# Patient Record
Sex: MALE | Race: WHITE | NOT HISPANIC OR LATINO
[De-identification: names, ages, dates, MRNs, and addresses within clinical notes are randomized per-mention and may not be internally consistent; named-entity substitution may affect disease eponyms.]

---

## 2020-08-10 ENCOUNTER — TRANSCRIPTION ENCOUNTER (OUTPATIENT)
Age: 70
End: 2020-08-10

## 2021-02-02 ENCOUNTER — APPOINTMENT (OUTPATIENT)
Dept: CARDIOLOGY | Facility: CLINIC | Age: 71
End: 2021-02-02
Payer: COMMERCIAL

## 2021-02-02 ENCOUNTER — NON-APPOINTMENT (OUTPATIENT)
Age: 71
End: 2021-02-02

## 2021-02-02 VITALS
WEIGHT: 170 LBS | SYSTOLIC BLOOD PRESSURE: 140 MMHG | HEART RATE: 105 BPM | BODY MASS INDEX: 25.18 KG/M2 | DIASTOLIC BLOOD PRESSURE: 70 MMHG | HEIGHT: 69 IN | OXYGEN SATURATION: 97 %

## 2021-02-02 PROCEDURE — 99072 ADDL SUPL MATRL&STAF TM PHE: CPT

## 2021-02-02 PROCEDURE — 93246 EXT ECG>7D<15D RECORDING: CPT

## 2021-02-02 PROCEDURE — 99204 OFFICE O/P NEW MOD 45 MIN: CPT | Mod: 25

## 2021-02-02 PROCEDURE — 93000 ELECTROCARDIOGRAM COMPLETE: CPT | Mod: 59

## 2021-02-02 NOTE — REVIEW OF SYSTEMS
[Negative] : Heme/Lymph [see HPI] : see HPI [Shortness Of Breath] : no shortness of breath [Dyspnea on exertion] : not dyspnea during exertion [Chest  Pressure] : no chest pressure [Chest Pain] : no chest pain [Lower Ext Edema] : no extremity edema [Leg Claudication] : no intermittent leg claudication [Palpitations] : palpitations

## 2021-02-02 NOTE — HISTORY OF PRESENT ILLNESS
[FreeTextEntry1] : Pt is a 71 y/o M who presents today for evaluation.  He has PMH SVT dx about 20 yrs ago, he was not started on medications.  Since then he has had occasional episodes and recently the episodes are more frequent.  Now he gets episodes every 2.5-3 weeks which last 2-3hrs and the next day he will still feel shorter episodes.  \par He has a sleep study pending  \par COVID+ 2021 hospitalized at [facility]\par \par PMH: SVT\par PCP Dr Jeferson Hassan in [place]\par Smoking status: never\par social ETOH\par no drug use\par Current exercise: walking 1-2 miles 3-4x/week\par Daily water intake: 32-50 oz\par Daily caffeine intake: 2 cups coffee\par OTC medications: tylenol PRN\par Family hx: mother rheumatic fever\par Previous cardiac testin yrs ago stress test and TTE "normal"\par Previous hospitalizations: COVID 2021\par

## 2021-02-02 NOTE — PHYSICAL EXAM
[General Appearance - Well Developed] : well developed [Normal Appearance] : normal appearance [Well Groomed] : well groomed [General Appearance - Well Nourished] : well nourished [No Deformities] : no deformities [General Appearance - In No Acute Distress] : no acute distress [Normal Conjunctiva] : the conjunctiva exhibited no abnormalities [Eyelids - No Xanthelasma] : the eyelids demonstrated no xanthelasmas [Normal Oral Mucosa] : normal oral mucosa [No Oral Pallor] : no oral pallor [No Oral Cyanosis] : no oral cyanosis [Heart Rate And Rhythm] : heart rate and rhythm were normal [Heart Sounds] : normal S1 and S2 [Arterial Pulses Normal] : the arterial pulses were normal [Edema] : no peripheral edema present [Respiration, Rhythm And Depth] : normal respiratory rhythm and effort [Exaggerated Use Of Accessory Muscles For Inspiration] : no accessory muscle use [Auscultation Breath Sounds / Voice Sounds] : lungs were clear to auscultation bilaterally [Abdomen Soft] : soft [Abdomen Tenderness] : non-tender [Abdomen Mass (___ Cm)] : no abdominal mass palpated [Abnormal Walk] : normal gait [Gait - Sufficient For Exercise Testing] : the gait was sufficient for exercise testing [Nail Clubbing] : no clubbing of the fingernails [Cyanosis, Localized] : no localized cyanosis [Petechial Hemorrhages (___cm)] : no petechial hemorrhages [] : no ischemic changes [Oriented To Time, Place, And Person] : oriented to person, place, and time [Impaired Insight] : insight and judgment were intact [Affect] : the affect was normal [Mood] : the mood was normal [No Anxiety] : not feeling anxious [Systolic grade ___/6] : A grade [unfilled]/6 systolic murmur was heard. [FreeTextEntry1] : harsh systolic murmur

## 2021-02-02 NOTE — DISCUSSION/SUMMARY
[FreeTextEntry1] : Pt is a 69 y/o M with PMH SVT now with worsening palpitations\par + systolic murmur on exam\par Will check transthoracic echocardiogram to evaluate left ventricular function and assess for any structural abnormalities\par Given pt's symptoms will check quezada monitor to assess for ectopy.  Advised adequate hydration.  Drug use, OTC medication use, caffeine consumption reviewed \par Will check nuclear stress test to eval for ischemia\par start metoprolol succinate 25mg qd\par Advised pt to go to the nearest ED if symptoms persist or worsen\par Will try and get recent lab work done at PCP office\par The described plan was discussed with the pt.  All questions and concerns were addressed to the best of my knowledge.

## 2021-02-25 ENCOUNTER — NON-APPOINTMENT (OUTPATIENT)
Age: 71
End: 2021-02-25

## 2021-03-01 PROCEDURE — 93248 EXT ECG>7D<15D REV&INTERPJ: CPT

## 2021-04-02 ENCOUNTER — APPOINTMENT (OUTPATIENT)
Dept: CARDIOLOGY | Facility: CLINIC | Age: 71
End: 2021-04-02
Payer: COMMERCIAL

## 2021-04-02 PROCEDURE — 93017 CV STRESS TEST TRACING ONLY: CPT

## 2021-04-02 PROCEDURE — 93018 CV STRESS TEST I&R ONLY: CPT

## 2021-04-02 PROCEDURE — A9500: CPT

## 2021-04-02 PROCEDURE — 99072 ADDL SUPL MATRL&STAF TM PHE: CPT

## 2021-04-02 PROCEDURE — 78452 HT MUSCLE IMAGE SPECT MULT: CPT

## 2021-04-05 ENCOUNTER — APPOINTMENT (OUTPATIENT)
Dept: CARDIOLOGY | Facility: CLINIC | Age: 71
End: 2021-04-05

## 2021-06-24 ENCOUNTER — APPOINTMENT (OUTPATIENT)
Dept: CARDIOLOGY | Facility: CLINIC | Age: 71
End: 2021-06-24
Payer: COMMERCIAL

## 2021-06-24 ENCOUNTER — NON-APPOINTMENT (OUTPATIENT)
Age: 71
End: 2021-06-24

## 2021-06-24 VITALS
OXYGEN SATURATION: 99 % | DIASTOLIC BLOOD PRESSURE: 94 MMHG | HEART RATE: 93 BPM | SYSTOLIC BLOOD PRESSURE: 146 MMHG | BODY MASS INDEX: 26.66 KG/M2 | WEIGHT: 180 LBS | HEIGHT: 69 IN

## 2021-06-24 DIAGNOSIS — G47.33 OBSTRUCTIVE SLEEP APNEA (ADULT) (PEDIATRIC): ICD-10-CM

## 2021-06-24 DIAGNOSIS — Z00.00 ENCOUNTER FOR GENERAL ADULT MEDICAL EXAMINATION W/OUT ABNORMAL FINDINGS: ICD-10-CM

## 2021-06-24 PROCEDURE — 99214 OFFICE O/P EST MOD 30 MIN: CPT | Mod: 25

## 2021-06-24 PROCEDURE — 99072 ADDL SUPL MATRL&STAF TM PHE: CPT

## 2021-06-24 PROCEDURE — 93000 ELECTROCARDIOGRAM COMPLETE: CPT

## 2021-07-02 ENCOUNTER — NON-APPOINTMENT (OUTPATIENT)
Age: 71
End: 2021-07-02

## 2021-07-02 PROBLEM — G47.33 OSA (OBSTRUCTIVE SLEEP APNEA): Status: ACTIVE | Noted: 2021-03-31

## 2021-07-02 NOTE — HISTORY OF PRESENT ILLNESS
[FreeTextEntry1] : Pt is a 70 y/o M PMH SVT dx about 20 yrs ago, he was not started on medications.  Last OV he was started on metoprolol 25mg which has worked very well.  For the past mnth he has noticed an increase in his palpitations - he wants to increase his metoprolol \par COVID+ 2021 hospitalized at [facility]\par \par quezada 2021 NSR brief episodes SVT\par nuclear stress test 2021 normal myocardial perfusion, EF 58%\par \par PMH: SVT\par PCP Dr Jeferson Hassan in [place]\par Smoking status: never\par social ETOH\par no drug use\par Current exercise: walking 1-2 miles 3-4x/week\par Daily water intake: 32-50 oz\par Daily caffeine intake: 2 cups coffee\par OTC medications: tylenol PRN\par Family hx: mother rheumatic fever\par Previous cardiac testin yrs ago stress test and TTE "normal"\par Previous hospitalizations: COVID 2021\par

## 2021-07-02 NOTE — DISCUSSION/SUMMARY
[FreeTextEntry1] : Pt is a 72 y/o M with PMH SVT now with worsening palpitations\par + systolic murmur on exam\par Will check transthoracic echocardiogram to evaluate left ventricular function and assess for any structural abnormalities\par increase metoprolol succinate to 50mg qd\par Advised pt to go to the nearest ED if symptoms persist or worsen\par Pt will return in 3-6 mnths or sooner as needed but I encouraged communication via phone or portal if necessary. \par The described plan was discussed with the pt.  All questions and concerns were addressed to the best of my knowledge.

## 2021-07-02 NOTE — PHYSICAL EXAM
[Well Developed] : well developed [Well Nourished] : well nourished [No Acute Distress] : no acute distress [Normal Conjunctiva] : normal conjunctiva [Normal Venous Pressure] : normal venous pressure [No Carotid Bruit] : no carotid bruit [Normal S1, S2] : normal S1, S2 [No Rub] : no rub [No Gallop] : no gallop [Clear Lung Fields] : clear lung fields [Good Air Entry] : good air entry [No Respiratory Distress] : no respiratory distress  [Soft] : abdomen soft [Non Tender] : non-tender [No Masses/organomegaly] : no masses/organomegaly [Normal Bowel Sounds] : normal bowel sounds [Normal Gait] : normal gait [No Edema] : no edema [No Cyanosis] : no cyanosis [No Clubbing] : no clubbing [No Varicosities] : no varicosities [No Rash] : no rash [No Skin Lesions] : no skin lesions [Moves all extremities] : moves all extremities [No Focal Deficits] : no focal deficits [Normal Speech] : normal speech [Alert and Oriented] : alert and oriented [Normal memory] : normal memory [de-identified] : +sys murmur

## 2021-07-02 NOTE — REVIEW OF SYSTEMS
[Palpitations] : palpitations [Negative] : Heme/Lymph [SOB] : no shortness of breath [Leg Claudication] : no intermittent leg claudication [Syncope] : no syncope

## 2022-04-01 ENCOUNTER — RX RENEWAL (OUTPATIENT)
Age: 72
End: 2022-04-01

## 2022-04-19 ENCOUNTER — TRANSCRIPTION ENCOUNTER (OUTPATIENT)
Age: 72
End: 2022-04-19

## 2022-05-14 ENCOUNTER — NON-APPOINTMENT (OUTPATIENT)
Age: 72
End: 2022-05-14

## 2022-06-28 ENCOUNTER — RX RENEWAL (OUTPATIENT)
Age: 72
End: 2022-06-28

## 2022-07-19 ENCOUNTER — NON-APPOINTMENT (OUTPATIENT)
Age: 72
End: 2022-07-19

## 2022-07-19 ENCOUNTER — APPOINTMENT (OUTPATIENT)
Dept: CARDIOLOGY | Facility: CLINIC | Age: 72
End: 2022-07-19

## 2022-07-19 VITALS
WEIGHT: 179 LBS | HEART RATE: 77 BPM | BODY MASS INDEX: 26.51 KG/M2 | SYSTOLIC BLOOD PRESSURE: 154 MMHG | DIASTOLIC BLOOD PRESSURE: 96 MMHG | HEIGHT: 69 IN

## 2022-07-19 DIAGNOSIS — R00.2 PALPITATIONS: ICD-10-CM

## 2022-07-19 PROCEDURE — 93000 ELECTROCARDIOGRAM COMPLETE: CPT

## 2022-07-19 PROCEDURE — 99214 OFFICE O/P EST MOD 30 MIN: CPT | Mod: 25

## 2022-07-19 NOTE — DISCUSSION/SUMMARY
[FreeTextEntry1] : Pt is a 71 y/o M with PMH SVT, murmur\par \par SVT:\par metoprolol 50mg has helped with symptoms\par Advised to stay well hydrated, regular exercise\par \par + systolic murmur on exam:\par Will check transthoracic echocardiogram to evaluate left ventricular function and assess for any structural abnormalities\par Advised importance of keeping appointment\par \par HTN:\par not well controlled\par metoprolol recently increased to 50mg qd\par Advised low salt diet, regular exercise, weight loss \par He will check BP at home\par check labs\par \par Pt will return in 3-4 mnths or sooner as needed but I encouraged communication via phone or portal if necessary. \par The described plan was discussed with the pt.  All questions and concerns were addressed to the best of my knowledge.

## 2022-07-19 NOTE — HISTORY OF PRESENT ILLNESS
[FreeTextEntry1] : Pt is a 73 y/o M PMH SVT dx about 20 yrs ago, he has been taking metoprolol 25mg and recently increased to 50mg due to palpitations which has helped.  Pt reports feeling well and has no active cardiac complaints - denies CP, SOB, dizziness, syncope, edema, orthopnea, PND, orthopnea.  No exertional symptoms. \par He has a systolic murmur on exam, possibly of AS, but has no showed his TTE appointments in the past\par COVID+ 01/2021 hospitalized at [facility]\par \par quezada 02/2021 NSR brief episodes SVT\par nuclear stress test 04/2021 normal myocardial perfusion, EF 58%\par \par PMH: SVT\par PCP Dr Jeferson Hassan in [place]\par Smoking status: never\par social ETOH\par no drug use\par Current exercise: walking 1-2 miles 3-4x/week\par Daily water intake: 32-50 oz\par Daily caffeine intake: 2 cups coffee\par OTC medications: tylenol PRN\par Family hx: mother rheumatic fever\par Previous hospitalizations: COVID 01/2021\par

## 2022-07-19 NOTE — PHYSICAL EXAM
[Well Developed] : well developed [Well Nourished] : well nourished [No Acute Distress] : no acute distress [Normal Conjunctiva] : normal conjunctiva [Normal Venous Pressure] : normal venous pressure [No Carotid Bruit] : no carotid bruit [Normal S1, S2] : normal S1, S2 [No Rub] : no rub [No Gallop] : no gallop [Clear Lung Fields] : clear lung fields [Good Air Entry] : good air entry [No Respiratory Distress] : no respiratory distress  [Soft] : abdomen soft [Non Tender] : non-tender [No Masses/organomegaly] : no masses/organomegaly [Normal Bowel Sounds] : normal bowel sounds [Normal Gait] : normal gait [No Edema] : no edema [No Cyanosis] : no cyanosis [No Clubbing] : no clubbing [No Varicosities] : no varicosities [No Rash] : no rash [No Skin Lesions] : no skin lesions [Moves all extremities] : moves all extremities [No Focal Deficits] : no focal deficits [Normal Speech] : normal speech [Alert and Oriented] : alert and oriented [Normal memory] : normal memory [de-identified] : +sys murmur

## 2022-08-16 ENCOUNTER — APPOINTMENT (OUTPATIENT)
Dept: CARDIOLOGY | Facility: CLINIC | Age: 72
End: 2022-08-16

## 2022-08-16 PROCEDURE — 93306 TTE W/DOPPLER COMPLETE: CPT

## 2022-08-17 ENCOUNTER — NON-APPOINTMENT (OUTPATIENT)
Age: 72
End: 2022-08-17

## 2022-09-06 ENCOUNTER — APPOINTMENT (OUTPATIENT)
Dept: CARDIOTHORACIC SURGERY | Facility: CLINIC | Age: 72
End: 2022-09-06

## 2022-09-06 VITALS — RESPIRATION RATE: 16 BRPM | BODY MASS INDEX: 25.92 KG/M2 | WEIGHT: 175 LBS | HEIGHT: 69 IN

## 2022-09-06 DIAGNOSIS — Z82.49 FAMILY HISTORY OF ISCHEMIC HEART DISEASE AND OTHER DISEASES OF THE CIRCULATORY SYSTEM: ICD-10-CM

## 2022-09-06 DIAGNOSIS — Z78.9 OTHER SPECIFIED HEALTH STATUS: ICD-10-CM

## 2022-09-06 PROCEDURE — 99205 OFFICE O/P NEW HI 60 MIN: CPT

## 2022-09-06 NOTE — PHYSICAL EXAM
[General Appearance - Well Nourished] : well nourished [General Appearance - Well Developed] : well developed [Sclera] : the sclera and conjunctiva were normal [Outer Ear] : the ears and nose were normal in appearance [Neck Appearance] : the appearance of the neck was normal [Respiration, Rhythm And Depth] : normal respiratory rhythm and effort [Auscultation Breath Sounds / Voice Sounds] : lungs were clear to auscultation bilaterally [Heart Rate And Rhythm] : heart rate was normal and rhythm regular [Examination Of The Chest] : the chest was normal in appearance [2+] : left 2+ [Abnormal Walk] : normal gait [Motor Tone] : muscle strength and tone were normal [Skin Color & Pigmentation] : normal skin color and pigmentation [Sensation] : the sensory exam was normal to light touch and pinprick [Motor Exam] : the motor exam was normal [Oriented To Time, Place, And Person] : oriented to person, place, and time [Impaired Insight] : insight and judgment were intact

## 2022-09-07 NOTE — HISTORY OF PRESENT ILLNESS
[FreeTextEntry1] : Mr. LIRIANO is a 72 year old male referred by Dr. Bailey who presents for consultation. His past medical history includes HTN, TAMARA and paroxysmal SVT. He was originally evaluated a year ago for his SVT and was placed on beta blocker. \par \par He presents to the office today to discuss recent echocardiogram imaging consistent with mitral valve regurgitation.\par \par \par \par

## 2022-09-07 NOTE — DATA REVIEWED
[FreeTextEntry1] : Transthoracic Echocardiogram from 08/16/22 at [facility]\par - LVEF 65%\par - MAC and calcified mitral leaflets with normal opening. Mitral valve prolapse vs possible flail involving the posterior mitral leaflet. Severe mitral regurgitation anteriorly directed. PMVG 10mmHg\par - Severely dilated left atrium\par - Mild-moderate TR\par

## 2022-09-07 NOTE — CONSULT LETTER
[Dear  ___] : Dear  [unfilled], [Consult Letter:] : I had the pleasure of evaluating your patient, [unfilled]. [Please see my note below.] : Please see my note below. [Consult Closing:] : Thank you very much for allowing me to participate in the care of this patient.  If you have any questions, please do not hesitate to contact me. [Sincerely,] : Sincerely, [FreeTextEntry2] : Alem Bailey MD [FreeTextEntry3] : Angel Rhodes MD\par  of Cardiothoracic Surgery\par [facility]\par [address] \[place] [address]\par [phone]\par

## 2022-09-07 NOTE — ASSESSMENT
[FreeTextEntry1] : Mr Santos reports to the office for discussion of echocardiogram results. 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 There is a large murmur on exam however he displays no symptoms. We discussed that based on the imaging presented mitral valve repair or replacement is warranted. \par \par He is changing insurance companies at this time and would like to wait to proceed until next month when his insurance changes. He will discuss this with his wife as he is extremely anxious to proceed with any sort of intervention. I am requesting additional imaging in preparation for possible surgical intervention. He will return to care after imaging to discuss plan of care. \par \par PLAN:\par - Transesophageal Echocardiogram \par - Cardiac Catheterization\par - Return to care after imaging \par \par \par \par \par \par Simon LOWRY NP am scribing for and in the presence of Dr. Rhodes the following sections HISTORY OF PRESENT ILLNESS, PAST MEDICAL/FAMILY/SOCIAL HISTORY; REVIEW OF SYSTEMS; VITAL SIGNS; PHYSICAL EXAM; DISPOSITION.\par \par "I personally performed the services described in the documentation, reviewed the documentation recorded by the scribe in my presence and accurately and completely records my words and actions."\par

## 2022-10-03 ENCOUNTER — RX RENEWAL (OUTPATIENT)
Age: 72
End: 2022-10-03

## 2022-11-01 ENCOUNTER — APPOINTMENT (OUTPATIENT)
Dept: CARDIOLOGY | Facility: CLINIC | Age: 72
End: 2022-11-01

## 2022-11-01 ENCOUNTER — NON-APPOINTMENT (OUTPATIENT)
Age: 72
End: 2022-11-01

## 2022-11-01 VITALS
HEART RATE: 83 BPM | BODY MASS INDEX: 27.55 KG/M2 | SYSTOLIC BLOOD PRESSURE: 160 MMHG | DIASTOLIC BLOOD PRESSURE: 90 MMHG | OXYGEN SATURATION: 100 % | HEIGHT: 69 IN | WEIGHT: 186 LBS

## 2022-11-01 DIAGNOSIS — I47.1 SUPRAVENTRICULAR TACHYCARDIA: ICD-10-CM

## 2022-11-01 PROCEDURE — 93000 ELECTROCARDIOGRAM COMPLETE: CPT

## 2022-11-01 PROCEDURE — 99214 OFFICE O/P EST MOD 30 MIN: CPT | Mod: 25

## 2022-11-01 NOTE — PHYSICAL EXAM
[Well Developed] : well developed [Well Nourished] : well nourished [No Acute Distress] : no acute distress [Normal Conjunctiva] : normal conjunctiva [Normal Venous Pressure] : normal venous pressure [No Carotid Bruit] : no carotid bruit [Normal S1, S2] : normal S1, S2 [No Rub] : no rub [No Gallop] : no gallop [Clear Lung Fields] : clear lung fields [Good Air Entry] : good air entry [No Respiratory Distress] : no respiratory distress  [Soft] : abdomen soft [Non Tender] : non-tender [No Masses/organomegaly] : no masses/organomegaly [Normal Bowel Sounds] : normal bowel sounds [Normal Gait] : normal gait [No Edema] : no edema [No Cyanosis] : no cyanosis [No Clubbing] : no clubbing [No Varicosities] : no varicosities [No Rash] : no rash [No Skin Lesions] : no skin lesions [Moves all extremities] : moves all extremities [No Focal Deficits] : no focal deficits [Normal Speech] : normal speech [Alert and Oriented] : alert and oriented [Normal memory] : normal memory [de-identified] : +sys murmur

## 2022-11-01 NOTE — DISCUSSION/SUMMARY
[FreeTextEntry1] : Pt is a 73 y/o M with PMH sev MR, SVT\par \par sev MR:\par EF 65%\par Was seen by Dr Rhodes, plan for cardiac cath and DARREN\par \par SVT:\par metoprolol 50mg has helped with symptoms\par Advised to stay well hydrated, regular exercise\par \par HTN:\par not well controlled\par c/w metoprolol 50mg qd\par increase losartan to 50mg qd\par Advised low salt diet, regular exercise, weight loss \par He will check BP at home\par \par Pt will return in 3-4 mnths or sooner as needed but I encouraged communication via phone or portal if necessary. \par The described plan was discussed with the pt.  All questions and concerns were addressed to the best of my knowledge.

## 2022-11-01 NOTE — HISTORY OF PRESENT ILLNESS
[FreeTextEntry1] : Pt is a 73 y/o M PMH sev MR, EF 65%, SVT dx about 20 yrs ago, he has been taking metoprolol 25mg and recently increased to 50mg due to palpitations which has helped.  \par He saw Dr Rhodes 09/2022 and was referred for cardiac cath and DARREN - i the process of scheduling\par Pt reports feeling well and has no active cardiac complaints - denies CP, SOB, dizziness, syncope, edema, orthopnea, PND, orthopnea.  No exertional symptoms. \par COVID+ 01/2021 hospitalized at [facility]\par \par TTE 08/2022 EF 65%, sev MR - possible flail leaflet, sev LAE, mild to mod TR\par quezada 02/2021 NSR brief episodes SVT\par nuclear stress test 04/2021 normal myocardial perfusion, EF 58%\par \par PMH: SVT\par PCP Dr Jeferson Hassan in [place]\par Smoking status: never\par social ETOH\par no drug use\par Current exercise: walking 1-2 miles 3-4x/week\par Daily water intake: 32-50 oz\par Daily caffeine intake: 2 cups coffee\par OTC medications: tylenol PRN\par Family hx: mother rheumatic fever\par Previous hospitalizations: COVID 01/2021\par

## 2022-11-11 RX ORDER — CHLORHEXIDINE GLUCONATE 213 G/1000ML
1 SOLUTION TOPICAL ONCE
Refills: 0 | Status: DISCONTINUED | OUTPATIENT
Start: 2022-11-14 | End: 2022-11-28

## 2022-11-11 NOTE — H&P PST ADULT - HISTORY OF PRESENT ILLNESS
Narrative:   71 YO male with severe MR, SVT, HTN who presents for LHC/DARREN to assess valve and R/O CAD. He has an abnormal TTE which showed severe MR, possible flail leaflet.    Symptoms:        Angina (Class):        Ischemic Symptoms:     Heart Failure:        Systolic/Diastolic/Combined:        NYHA Class (within 2 weeks):     Assessment of LVEF:       EF: 65%       Assessed by: TTE       Date: 8/2022    Prior Cardiac Interventions:       PCI's:        CABG:     Noninvasive Testing:   Stress Test: Date: 4/2021: normal          Echo: EF 65% sev MR-possible flail leaflet, sev LAE, mild to mod TR    Antianginal Therapies:        Beta Blockers:  Metoprolol       Calcium Channel Blockers:        Long Acting Nitrates:        Ranexa:     Associated Risk Factors:        Cerebrovascular Disease: N/A       Chronic Lung Disease: N/A       Peripheral Arterial Disease: N/A       Chronic Kidney Disease (if yes, what is GFR): N/A       Uncontrolled Diabetes (if yes, what is HgbA1C or FBS): N/A       Poorly Controlled Hypertension (if yes, what is SBP): N/A       Morbid Obesity (if yes, what is BMI): N/A       History of Recent Ventricular Arrhythmia: N/A       Inability to Ambulate Safely: N/A       Need for Therapeutic Anticoagulation: N/A       Antiplatelet or Contrast Allergy: N/A Narrative:   71 YO male with severe MR, SVT, HTN who presents for LHC/DARREN to assess valve and R/O CAD. He has an abnormal TTE which showed severe MR, possible flail leaflet.    Symptoms:        Angina (Class):        Ischemic Symptoms:     Heart Failure:        Systolic/Diastolic/Combined:        NYHA Class (within 2 weeks):     Assessment of LVEF:       EF: 65%       Assessed by: TTE       Date: 8/2022      Noninvasive Testing:   Stress Test: Date: 4/2021: normal          Echo: EF 65% sev MR-possible flail leaflet, sev LAE, mild to mod TR    Antianginal Therapies:        Beta Blockers:  Metoprolol       Calcium Channel Blockers:        Long Acting Nitrates:        Ranexa:     Associated Risk Factors:        Cerebrovascular Disease: N/A       Chronic Lung Disease: N/A       Peripheral Arterial Disease: N/A       Chronic Kidney Disease (if yes, what is GFR): N/A       Uncontrolled Diabetes (if yes, what is HgbA1C or FBS): N/A       Poorly Controlled Hypertension (if yes, what is SBP): N/A       Morbid Obesity (if yes, what is BMI): N/A       History of Recent Ventricular Arrhythmia: N/A       Inability to Ambulate Safely: N/A       Need for Therapeutic Anticoagulation: N/A       Antiplatelet or Contrast Allergy: N/A

## 2022-11-11 NOTE — H&P PST ADULT - ASSESSMENT
73 YO male with severe MR, SVT, HTN with severe MR, possible flail leaflet for DARREN/LHC  -Proceed with DARREN/LHC as planned  -Keep NPO  -Procedure explained in length and patient verbalizes understanding  -Cardiologist to obtain informed consent

## 2022-11-13 ENCOUNTER — FORM ENCOUNTER (OUTPATIENT)
Age: 72
End: 2022-11-13

## 2022-11-14 ENCOUNTER — TRANSCRIPTION ENCOUNTER (OUTPATIENT)
Age: 72
End: 2022-11-14

## 2022-11-14 ENCOUNTER — OUTPATIENT (OUTPATIENT)
Dept: OUTPATIENT SERVICES | Facility: HOSPITAL | Age: 72
LOS: 1 days | End: 2022-11-14
Payer: COMMERCIAL

## 2022-11-14 VITALS
OXYGEN SATURATION: 98 % | TEMPERATURE: 97 F | SYSTOLIC BLOOD PRESSURE: 162 MMHG | RESPIRATION RATE: 17 BRPM | DIASTOLIC BLOOD PRESSURE: 94 MMHG | HEART RATE: 79 BPM

## 2022-11-14 VITALS
SYSTOLIC BLOOD PRESSURE: 141 MMHG | RESPIRATION RATE: 16 BRPM | DIASTOLIC BLOOD PRESSURE: 85 MMHG | OXYGEN SATURATION: 97 % | HEART RATE: 67 BPM

## 2022-11-14 DIAGNOSIS — I34.0 NONRHEUMATIC MITRAL (VALVE) INSUFFICIENCY: ICD-10-CM

## 2022-11-14 LAB
ALBUMIN SERPL ELPH-MCNC: 4.8 G/DL — SIGNIFICANT CHANGE UP (ref 3.3–5.2)
ALP SERPL-CCNC: 74 U/L — SIGNIFICANT CHANGE UP (ref 40–120)
ALT FLD-CCNC: 21 U/L — SIGNIFICANT CHANGE UP
ANION GAP SERPL CALC-SCNC: 10 MMOL/L — SIGNIFICANT CHANGE UP (ref 5–17)
AST SERPL-CCNC: 20 U/L — SIGNIFICANT CHANGE UP
BASOPHILS # BLD AUTO: 0.03 K/UL — SIGNIFICANT CHANGE UP (ref 0–0.2)
BASOPHILS NFR BLD AUTO: 0.4 % — SIGNIFICANT CHANGE UP (ref 0–2)
BILIRUB SERPL-MCNC: 0.5 MG/DL — SIGNIFICANT CHANGE UP (ref 0.4–2)
BUN SERPL-MCNC: 15.7 MG/DL — SIGNIFICANT CHANGE UP (ref 8–20)
CALCIUM SERPL-MCNC: 9.6 MG/DL — SIGNIFICANT CHANGE UP (ref 8.4–10.5)
CHLORIDE SERPL-SCNC: 103 MMOL/L — SIGNIFICANT CHANGE UP (ref 96–108)
CO2 SERPL-SCNC: 27 MMOL/L — SIGNIFICANT CHANGE UP (ref 22–29)
CREAT SERPL-MCNC: 0.75 MG/DL — SIGNIFICANT CHANGE UP (ref 0.5–1.3)
EGFR: 97 ML/MIN/1.73M2 — SIGNIFICANT CHANGE UP
EOSINOPHIL # BLD AUTO: 0.02 K/UL — SIGNIFICANT CHANGE UP (ref 0–0.5)
EOSINOPHIL NFR BLD AUTO: 0.3 % — SIGNIFICANT CHANGE UP (ref 0–6)
GLUCOSE SERPL-MCNC: 114 MG/DL — HIGH (ref 70–99)
HCT VFR BLD CALC: 41.9 % — SIGNIFICANT CHANGE UP (ref 39–50)
HGB BLD-MCNC: 14.8 G/DL — SIGNIFICANT CHANGE UP (ref 13–17)
IMM GRANULOCYTES NFR BLD AUTO: 0.4 % — SIGNIFICANT CHANGE UP (ref 0–0.9)
LYMPHOCYTES # BLD AUTO: 2.18 K/UL — SIGNIFICANT CHANGE UP (ref 1–3.3)
LYMPHOCYTES # BLD AUTO: 32 % — SIGNIFICANT CHANGE UP (ref 13–44)
MCHC RBC-ENTMCNC: 31.2 PG — SIGNIFICANT CHANGE UP (ref 27–34)
MCHC RBC-ENTMCNC: 35.3 GM/DL — SIGNIFICANT CHANGE UP (ref 32–36)
MCV RBC AUTO: 88.4 FL — SIGNIFICANT CHANGE UP (ref 80–100)
MONOCYTES # BLD AUTO: 0.35 K/UL — SIGNIFICANT CHANGE UP (ref 0–0.9)
MONOCYTES NFR BLD AUTO: 5.1 % — SIGNIFICANT CHANGE UP (ref 2–14)
NEUTROPHILS # BLD AUTO: 4.21 K/UL — SIGNIFICANT CHANGE UP (ref 1.8–7.4)
NEUTROPHILS NFR BLD AUTO: 61.8 % — SIGNIFICANT CHANGE UP (ref 43–77)
PLATELET # BLD AUTO: 166 K/UL — SIGNIFICANT CHANGE UP (ref 150–400)
POTASSIUM SERPL-MCNC: 4.3 MMOL/L — SIGNIFICANT CHANGE UP (ref 3.5–5.3)
POTASSIUM SERPL-SCNC: 4.3 MMOL/L — SIGNIFICANT CHANGE UP (ref 3.5–5.3)
PROT SERPL-MCNC: 7.4 G/DL — SIGNIFICANT CHANGE UP (ref 6.6–8.7)
RBC # BLD: 4.74 M/UL — SIGNIFICANT CHANGE UP (ref 4.2–5.8)
RBC # FLD: 12.7 % — SIGNIFICANT CHANGE UP (ref 10.3–14.5)
SODIUM SERPL-SCNC: 140 MMOL/L — SIGNIFICANT CHANGE UP (ref 135–145)
WBC # BLD: 6.82 K/UL — SIGNIFICANT CHANGE UP (ref 3.8–10.5)
WBC # FLD AUTO: 6.82 K/UL — SIGNIFICANT CHANGE UP (ref 3.8–10.5)

## 2022-11-14 PROCEDURE — 80053 COMPREHEN METABOLIC PANEL: CPT

## 2022-11-14 PROCEDURE — 93320 DOPPLER ECHO COMPLETE: CPT | Mod: 26

## 2022-11-14 PROCEDURE — C1894: CPT

## 2022-11-14 PROCEDURE — 93312 ECHO TRANSESOPHAGEAL: CPT | Mod: 26

## 2022-11-14 PROCEDURE — C1769: CPT

## 2022-11-14 PROCEDURE — 85025 COMPLETE CBC W/AUTO DIFF WBC: CPT

## 2022-11-14 PROCEDURE — 76376 3D RENDER W/INTRP POSTPROCES: CPT | Mod: 26

## 2022-11-14 PROCEDURE — 93325 DOPPLER ECHO COLOR FLOW MAPG: CPT

## 2022-11-14 PROCEDURE — 93005 ELECTROCARDIOGRAM TRACING: CPT

## 2022-11-14 PROCEDURE — 93320 DOPPLER ECHO COMPLETE: CPT

## 2022-11-14 PROCEDURE — 93312 ECHO TRANSESOPHAGEAL: CPT

## 2022-11-14 PROCEDURE — 93325 DOPPLER ECHO COLOR FLOW MAPG: CPT | Mod: 26

## 2022-11-14 PROCEDURE — C1887: CPT

## 2022-11-14 PROCEDURE — 36415 COLL VENOUS BLD VENIPUNCTURE: CPT

## 2022-11-14 PROCEDURE — 93010 ELECTROCARDIOGRAM REPORT: CPT

## 2022-11-14 RX ORDER — METOPROLOL TARTRATE 50 MG
1 TABLET ORAL
Qty: 0 | Refills: 0 | DISCHARGE

## 2022-11-14 RX ORDER — LOSARTAN POTASSIUM 100 MG/1
1 TABLET, FILM COATED ORAL
Qty: 0 | Refills: 0 | DISCHARGE

## 2022-11-14 RX ORDER — LOSARTAN POTASSIUM 100 MG/1
25 TABLET, FILM COATED ORAL DAILY
Refills: 0 | Status: DISCONTINUED | OUTPATIENT
Start: 2022-11-14 | End: 2022-11-28

## 2022-11-14 NOTE — DISCHARGE NOTE PROVIDER - NSDCFUADDINST_GEN_ALL_CORE_FT
Go to the ED with any acute onset of chest pain, palpitations, shortness of breath or dizziness.   Managing risk factors will help prevent future blockages, risk factors may include: high blood pressure, high cholesterol, obesity, sedentary life style and smoking.    Your diet should be low in fat, cholesterol, salt and carbohydrates, increase fruits (caution if diabetic), vegetables and whole grains/fiber rich foods.   Take all your cardiac  medications as prescribed.    No heavy lifting, excessine bending or range of motion on affected limb 5-7days.  No submerging the site in water (pool/bath) 5-7days. You may start showering the day after your procedure. Remove the dressing, cleanse the area with plain soap and water and then pat dry. You may place a bandaid for 1-2 days, NO cream,/lotion/medicines on the site.   Bruising and a small nodule at the site is normal. Call your doctor for any bleeding, swelling/hardness, increasing pain or redness, loss of sensation in the hand/leg or discoloration.   If heavy bleeding or large lumps form, hold pressure at the spot and come to the Emergency Room.  Exercise is a very important factor in heart health. Once your post procedure restrictions have passed, you should engage in heart healthy, aerobic exercise. Be sure to have clearance from your cardiologist. Cardiac rehab programs could be extremely beneficial and your cardiologist could help set this up.   Follow up with your cardiologist within 1-2 weeks after your procedure.   Call your cardiologist or our unit ([phone]) with any questions or concerns that may arise.

## 2022-11-14 NOTE — DISCHARGE NOTE PROVIDER - NSDCCPTREATMENT_GEN_ALL_CORE_FT
PRINCIPAL PROCEDURE  Procedure: Left heart cardiac cath  Findings and Treatment: Go to the ED with any acute onset of chest pain, palpitations, shortness of breath or dizziness.   Managing risk factors will help prevent future blockages, risk factors may include: high blood pressure, high cholesterol, obesity, sedentary life style and smoking.    Your diet should be low in fat, cholesterol, salt and carbohydrates, increase fruits (caution if diabetic), vegetables and whole grains/fiber rich foods.   Take all your cardiac  medications as prescribed.    No heavy lifting, excessine bending or range of motion on affected limb 5-7days.  No submerging the site in water (pool/bath) 5-7days. You may start showering the day after your procedure. Remove the dressing, cleanse the area with plain soap and water and then pat dry. You may place a bandaid for 1-2 days, NO cream,/lotion/medicines on the site.   Bruising and a small nodule at the site is normal. Call your doctor for any bleeding, swelling/hardness, increasing pain or redness, loss of sensation in the hand/leg or discoloration.   If heavy bleeding or large lumps form, hold pressure at the spot and come to the Emergency Room.  Exercise is a very important factor in heart health. Once your post procedure restrictions have passed, you should engage in heart healthy, aerobic exercise. Be sure to have clearance from your cardiologist. Cardiac rehab programs could be extremely beneficial and your cardiologist could help set this up.   Follow up with your cardiologist within 1-2 weeks after your procedure.   Call your cardiologist or our unit ([phone]) with any questions or concerns that may arise.      SECONDARY PROCEDURE  Procedure: Echocardiogram, transesophageal  Findings and Treatment: Do not drive or operate machinery today as you have received sedation. You should take it easy today and take your time, especially when changing positions. Follow up with Dr. Bailey to further discuss the results of the DARREN and any further evaluations and recommendations going forward.

## 2022-11-14 NOTE — DISCHARGE NOTE PROVIDER - NSDCMRMEDTOKEN_GEN_ALL_CORE_FT
losartan 25 mg oral tablet: 1 tab(s) orally once a day  Metoprolol Succinate ER 50 mg oral tablet, extended release: 1 tab(s) orally once a day

## 2022-11-14 NOTE — DISCHARGE NOTE PROVIDER - HOSPITAL COURSE
71 YO male with severe MR, SVT, HTN who presents for LHC/DARREN to assess valve and R/O CAD. He has an abnormal TTE which showed severe MR, possible flail leaflet.    s/p DARREN with LVEF 60-65%, P2 prolapse with flail and small rupture chordae, severe eccentric MR    DARREN Summary:   1. No cardiac mass, vegetations, thrombus or shunts visualized.   2. Severely enlarged left atrium.   3. No left atrial or left atrial appendage thrombus visualized. Left   atrial appendage enlargement and normal left atrial appendage velocities.   No PFO.   4. Left ventricular ejection fraction, by visual estimation, is 60 to   65%.   5. The right atrium is normal in size.   6. Normal right ventricular size and function.   7. There is large P2 segment with prolapse and flail with small ruptured   chordae. There is torrential (grade 5) severe MR eccentric directed   anterior-laterally with coanda effect.   8. There is no evidence of pericardial effusion.      Now s/p LHC via RRA with no obstructive CAD, procedure performed by Dr. Avery, received IA heparin and IV sedation intraprocedurally, arrived to recovery in NAD and HDS, RRA access site stable, no bleed/hematoma, distal pulse +, plan for discharge home after recovery.     Plan:  -Formal cath report pending  -Post procedure management/monitoring per protocol  -Access site precautions  -Radial compression band removal at 1915  -Bedrest x 1hours post procedure  -No IVH due to torrential MR  -Continue current medical therapy  -Educated regarding post procedure management and care  -Discussed the importance of RF modification  -F/U outpt in 1-2 weeks with Cardiologist Dr. Bailey to determine treatment plan for MR  -DISPO: Plan for D/C home after post procedure recovery completed.

## 2022-11-14 NOTE — DISCHARGE NOTE NURSING/CASE MANAGEMENT/SOCIAL WORK - NSDCPEFALRISK_GEN_ALL_CORE
For information on Fall & Injury Prevention, visit: [email or web address] OR  [email or web address] OR  https://www.cdc.gov/steadi/patient.html

## 2022-11-14 NOTE — DISCHARGE NOTE PROVIDER - NSDCFUSCHEDAPPT_GEN_ALL_CORE_FT
Angel Rhodes  [facility]  CTSURG [address]  Scheduled Appointment: 11/15/2022    Alem Bailey  [facility] Physician [facility]  CARDIOLOGY [address]  Scheduled Appointment: 02/02/2023

## 2022-11-14 NOTE — DISCHARGE NOTE PROVIDER - CARE PROVIDER_API CALL
Alem Bailey (DO)  Cardiovascular Disease; Internal Medicine  [address], [facility] [address]  [place] [address]  Phone: [phone]  Fax: [phone]  Follow Up Time: 1 week

## 2022-11-14 NOTE — ASU PATIENT PROFILE, ADULT - FALL HARM RISK - UNIVERSAL INTERVENTIONS
Bed in lowest position, wheels locked, appropriate side rails in place/Call bell, personal items and telephone in reach/Instruct patient to call for assistance before getting out of bed or chair/Non-slip footwear when patient is out of bed/[place] to call system/Physically safe environment - no spills, clutter or unnecessary equipment/Purposeful Proactive Rounding/Room/bathroom lighting operational, light cord in reach

## 2022-11-14 NOTE — DISCHARGE NOTE PROVIDER - NSDCCPCAREPLAN_GEN_ALL_CORE_FT
PRINCIPAL DISCHARGE DIAGNOSIS  Diagnosis: Mitral regurgitation  Assessment and Plan of Treatment: You have severe leaking of the mitral valve. Take your medications as prescribed. Monitor for symptoms of heart failure: increasing shortness of breath, inability to lie flat, swelling in your legs, increased abdomiinal girth or bloating, any significant increase in weight, increasing dizziness or fatigue and notify your provider.      SECONDARY DISCHARGE DIAGNOSES  Diagnosis: HTN (hypertension)  Assessment and Plan of Treatment: Continue to take antihypertensive medications as prescribed. Obtain a home blood pressure monitor (at any pharmacy or medical supply store) and monitor blood pressure trends. Call your doctor if you note you blood pressure to be running much higher or lower than usual. Limit salt intake.

## 2022-11-15 ENCOUNTER — APPOINTMENT (OUTPATIENT)
Dept: CARDIOTHORACIC SURGERY | Facility: CLINIC | Age: 72
End: 2022-11-15

## 2022-11-15 VITALS
OXYGEN SATURATION: 99 % | WEIGHT: 186 LBS | SYSTOLIC BLOOD PRESSURE: 160 MMHG | HEART RATE: 74 BPM | DIASTOLIC BLOOD PRESSURE: 95 MMHG | RESPIRATION RATE: 16 BRPM | BODY MASS INDEX: 27.55 KG/M2 | HEIGHT: 69 IN

## 2022-11-15 PROCEDURE — 99215 OFFICE O/P EST HI 40 MIN: CPT

## 2022-11-15 RX ORDER — METOPROLOL SUCCINATE 50 MG/1
50 TABLET, EXTENDED RELEASE ORAL
Qty: 90 | Refills: 3 | Status: COMPLETED | COMMUNITY
Start: 2021-02-02 | End: 2022-11-15

## 2022-11-15 NOTE — HISTORY OF PRESENT ILLNESS
[FreeTextEntry1] : Mr. LIRIANO is a 72 year old male referred by Dr. Bailey who presents for consultation. His past medical history includes HTN, TAMARA and paroxysmal SVT. He was originally evaluated a year ago for his SVT and was placed on beta blocker.   \par \par At our last visit he was asymptomatic from a cardiac perspective. It was recommended that he obtain testing despite lack of symptoms due to the degree of regurgitation of the mitral valve. His insurance was due to change and he requested to postpone testing. He has since met with Dr Bailey for follow up care and presents back to the office to discuss moving forward with mitral intervention.

## 2022-11-15 NOTE — CONSULT LETTER
[Dear  ___] : Dear  [unfilled], [Courtesy Letter:] : I had the pleasure of seeing your patient, [unfilled], in my office today. [Please see my note below.] : Please see my note below. [Consult Closing:] : Thank you very much for allowing me to participate in the care of this patient.  If you have any questions, please do not hesitate to contact me. [Sincerely,] : Sincerely, [FreeTextEntry2] : Alem Bailey MD [FreeTextEntry3] : Angel Rhodes MD\par  of Cardiothoracic Surgery\par [facility]\par [address] \[place] [address]\par [phone]\par

## 2022-11-15 NOTE — REVIEW OF SYSTEMS
[Feeling Poorly] : not feeling poorly [Feeling Tired] : feeling tired [Chest Pain] : no chest pain [Palpitations] : no palpitations [Lower Ext Edema] : no extremity edema [Shortness Of Breath] : shortness of breath [Cough] : no cough [SOB on Exertion] : shortness of breath during exertion [Negative] : Heme/Lymph

## 2022-11-15 NOTE — ASSESSMENT
[FreeTextEntry1] : Independent review of the Cardiac Catheterization as well as Transesophageal Echocardiogram. There is severe torrential mitral valve regurgitation present. He has has some shortness of breath with exertion and is currently noticed symptoms occasionally occurring at work. We discussed that having salty diet could effect his occasional random symptoms.\par \par Mitral Valve Repair was discussed at today's visit. He inquired about Mitral Clip however, he is not a candidate for this procedure. \par \par Risks benefits and alternatives to mitral valve repair possible replacement were discussed with the patient in detail. Risks discussed included, but not limited to infection, bleeding, myocardial infarction, cerebrovascular accident, renal failure, vascular injury requiring intervention, cardiac rupture, and death.\par \par The procedure, hospital stay and recovery was discussed in detail. Patient would like to proceed with surgical intervention as discussed.\par \par PLAN:\par - Carotid Artery Doppler\par - Pulmonary Function Testing\par - Mitral Valve Repair/possible replacement (bioprosthetic)\par \par \par \par \par \par \par \par ISimon NP am scribing for and in the presence of Dr. Rhodes the following sections HISTORY OF PRESENT ILLNESS, PAST MEDICAL/FAMILY/SOCIAL HISTORY; REVIEW OF SYSTEMS; VITAL SIGNS; PHYSICAL EXAM; DISPOSITION.\par \par "I personally performed the services described in the documentation, reviewed the documentation recorded by the scribe in my presence and accurately and completely records my words and actions."\par

## 2022-11-15 NOTE — PHYSICAL EXAM
[Sclera] : the sclera and conjunctiva were normal [Neck Appearance] : the appearance of the neck was normal [Respiration, Rhythm And Depth] : normal respiratory rhythm and effort [Auscultation Breath Sounds / Voice Sounds] : lungs were clear to auscultation bilaterally [Heart Rate And Rhythm] : heart rate was normal and rhythm regular [Examination Of The Chest] : the chest was normal in appearance [2+] : left 2+ [Abnormal Walk] : normal gait [Skin Color & Pigmentation] : normal skin color and pigmentation [Sensation] : the sensory exam was normal to light touch and pinprick [Motor Exam] : the motor exam was normal [Oriented To Time, Place, And Person] : oriented to person, place, and time [Impaired Insight] : insight and judgment were intact

## 2022-11-15 NOTE — DATA REVIEWED
[FreeTextEntry1] : Cardiac Catheterization from 11/14/22 at [facility] \par \par \par \par \par Transesophageal Echocardiogram 11/14/22 at [facility]

## 2022-11-18 PROBLEM — I10 ESSENTIAL (PRIMARY) HYPERTENSION: Chronic | Status: ACTIVE | Noted: 2022-11-14

## 2022-11-18 PROBLEM — I47.1 SUPRAVENTRICULAR TACHYCARDIA: Chronic | Status: ACTIVE | Noted: 2022-11-14

## 2022-11-21 ENCOUNTER — APPOINTMENT (OUTPATIENT)
Dept: PULMONOLOGY | Facility: CLINIC | Age: 72
End: 2022-11-21

## 2022-11-21 VITALS — BODY MASS INDEX: 29.41 KG/M2 | WEIGHT: 183 LBS | HEIGHT: 66 IN

## 2022-11-21 PROCEDURE — 94010 BREATHING CAPACITY TEST: CPT

## 2022-11-21 PROCEDURE — 94729 DIFFUSING CAPACITY: CPT

## 2022-11-21 PROCEDURE — 94727 GAS DIL/WSHOT DETER LNG VOL: CPT

## 2022-11-21 PROCEDURE — 85018 HEMOGLOBIN: CPT | Mod: QW

## 2022-11-22 ENCOUNTER — OUTPATIENT (OUTPATIENT)
Dept: OUTPATIENT SERVICES | Facility: HOSPITAL | Age: 72
LOS: 1 days | End: 2022-11-22
Payer: COMMERCIAL

## 2022-11-22 ENCOUNTER — APPOINTMENT (OUTPATIENT)
Dept: ULTRASOUND IMAGING | Facility: CLINIC | Age: 72
End: 2022-11-22

## 2022-11-22 DIAGNOSIS — I34.0 NONRHEUMATIC MITRAL (VALVE) INSUFFICIENCY: ICD-10-CM

## 2022-11-22 PROCEDURE — 93880 EXTRACRANIAL BILAT STUDY: CPT

## 2022-11-22 PROCEDURE — 93880 EXTRACRANIAL BILAT STUDY: CPT | Mod: 26

## 2022-12-08 ENCOUNTER — APPOINTMENT (OUTPATIENT)
Dept: CARDIOTHORACIC SURGERY | Facility: HOSPITAL | Age: 72
End: 2022-12-08

## 2023-01-11 ENCOUNTER — APPOINTMENT (OUTPATIENT)
Dept: CARDIOTHORACIC SURGERY | Facility: HOSPITAL | Age: 73
End: 2023-01-11

## 2023-02-02 ENCOUNTER — NON-APPOINTMENT (OUTPATIENT)
Age: 73
End: 2023-02-02

## 2023-02-02 ENCOUNTER — APPOINTMENT (OUTPATIENT)
Dept: CARDIOLOGY | Facility: CLINIC | Age: 73
End: 2023-02-02
Payer: COMMERCIAL

## 2023-02-02 VITALS
HEIGHT: 66 IN | HEART RATE: 76 BPM | OXYGEN SATURATION: 98 % | SYSTOLIC BLOOD PRESSURE: 140 MMHG | WEIGHT: 182 LBS | BODY MASS INDEX: 29.25 KG/M2 | DIASTOLIC BLOOD PRESSURE: 84 MMHG

## 2023-02-02 PROCEDURE — 99214 OFFICE O/P EST MOD 30 MIN: CPT | Mod: 25

## 2023-02-02 PROCEDURE — 93000 ELECTROCARDIOGRAM COMPLETE: CPT

## 2023-02-02 NOTE — REVIEW OF SYSTEMS
[Negative] : Heme/Lymph [Dyspnea on exertion] : dyspnea during exertion [Chest Discomfort] : no chest discomfort [Leg Claudication] : no intermittent leg claudication [Palpitations] : no palpitations [Orthopnea] : no orthopnea [Syncope] : no syncope

## 2023-02-02 NOTE — DISCUSSION/SUMMARY
[FreeTextEntry1] : Pt is a 73 y/o M with PMH sev MR, SVT\par \par sev MR:\par EF 65%\par He has been seeing Dr Reeves with [facility] for minimally invasive surgery - he has appt 02/22/2023 to have CT done and then will hopefully schedule surgery. \par c/w metoprolol\par Advised to restart losartan\par \par SVT:\par metoprolol 50mg has helped with symptoms\par Advised to stay well hydrated, regular exercise\par \par HTN:\par not well controlled\par c/w metoprolol 50mg qd\par advised to restart losartan to 50mg qd\par Advised low salt diet, regular exercise, weight loss \par He will check BP at home\par \par Pt will return in 4 mnths or sooner as needed but I encouraged communication via phone or portal if necessary. \par The described plan was discussed with the pt.  All questions and concerns were addressed to the best of my knowledge.

## 2023-02-02 NOTE — HISTORY OF PRESENT ILLNESS
[FreeTextEntry1] : Pt is a 73 y/o M PMH sev MR, EF 65%, SVT dx about 20 yrs ago, he has been taking metoprolol 25mg and recently increased to 50mg due to palpitations which has helped.  \par DARREN 11/2022 EF 60-65%, P2 segment with prolapse and flail with torrential MR\par He has been seeing Dr Reeves with [facility] for minimally invasive surgery - he has appt 02/22/2023 to have CT done and then will hopefully schedule surgery.  Today he tells me that he will feel SOB from time to time, especially when he eats salty foods\par He has stopped losartan on his own - didn’t want to take too many medications\par \par TTE 08/2022 EF 65%, sev MR - possible flail leaflet, sev LAE, mild to mod TR\par DARREN 11/2022 EF 60-65%, P2 segment with prolapse and flail with torrential MR\par quezada 02/2021 NSR brief episodes SVT\par nuclear stress test 04/2021 normal myocardial perfusion, EF 58%\par cardiac cath 11/2022 LAD and Cx mild atherosclerosis\par \par PMH: SVT\par PCP Dr Jeferson Hassan in [place]\par Smoking status: never\par social ETOH\par no drug use\par Current exercise: walking 1-2 miles 3-4x/week\par Daily water intake: 32-50 oz\par Daily caffeine intake: 2 cups coffee\par OTC medications: tylenol PRN\par Family hx: mother rheumatic fever\par Previous hospitalizations: COVID 01/2021\par

## 2023-02-02 NOTE — PHYSICAL EXAM
[Well Developed] : well developed [Well Nourished] : well nourished [No Acute Distress] : no acute distress [Normal Conjunctiva] : normal conjunctiva [Normal Venous Pressure] : normal venous pressure [No Carotid Bruit] : no carotid bruit [Normal S1, S2] : normal S1, S2 [No Rub] : no rub [No Gallop] : no gallop [Clear Lung Fields] : clear lung fields [Good Air Entry] : good air entry [No Respiratory Distress] : no respiratory distress  [Soft] : abdomen soft [Non Tender] : non-tender [No Masses/organomegaly] : no masses/organomegaly [Normal Bowel Sounds] : normal bowel sounds [Normal Gait] : normal gait [No Edema] : no edema [No Cyanosis] : no cyanosis [No Clubbing] : no clubbing [No Varicosities] : no varicosities [No Rash] : no rash [No Skin Lesions] : no skin lesions [Moves all extremities] : moves all extremities [No Focal Deficits] : no focal deficits [Normal Speech] : normal speech [Alert and Oriented] : alert and oriented [Normal memory] : normal memory [de-identified] : +sys murmur

## 2023-06-01 ENCOUNTER — APPOINTMENT (OUTPATIENT)
Dept: CARDIOLOGY | Facility: CLINIC | Age: 73
End: 2023-06-01
Payer: COMMERCIAL

## 2023-06-01 ENCOUNTER — NON-APPOINTMENT (OUTPATIENT)
Age: 73
End: 2023-06-01

## 2023-06-01 VITALS
HEIGHT: 66 IN | DIASTOLIC BLOOD PRESSURE: 82 MMHG | WEIGHT: 182 LBS | SYSTOLIC BLOOD PRESSURE: 138 MMHG | BODY MASS INDEX: 29.25 KG/M2 | OXYGEN SATURATION: 98 % | HEART RATE: 93 BPM

## 2023-06-01 PROCEDURE — 99214 OFFICE O/P EST MOD 30 MIN: CPT | Mod: 25

## 2023-06-01 PROCEDURE — 93000 ELECTROCARDIOGRAM COMPLETE: CPT

## 2023-06-01 NOTE — HISTORY OF PRESENT ILLNESS
[FreeTextEntry1] : Pt is a 72 y/o M PMH sev MR, EF 65%, SVT dx about 20 yrs ago, he has been taking metoprolol 25mg and recently increased to 50mg due to palpitations which has helped.  \par DARREN 11/2022 EF 60-65%, P2 segment with prolapse and flail with torrential MR\par \par He is s/p mitral valve repair minimally invasive surgery with Dr Reeves at [facility] 04/2023.  He was placed on amio and colchicine for 30 days post surgery \par He mentions some numbness at the R side of his chest.  He denies CP, SOB, diaphoresis, palpitations, dizziness, syncope, LE edema, PND, orthopnea. \par \par TTE 08/2022 EF 65%, sev MR - possible flail leaflet, sev LAE, mild to mod TR\par DARREN 11/2022 EF 60-65%, P2 segment with prolapse and flail with torrential MR\par quezada 02/2021 NSR brief episodes SVT\par nuclear stress test 04/2021 normal myocardial perfusion, EF 58%\par cardiac cath 11/2022 LAD and Cx mild atherosclerosis\par \par PMH: SVT\par PCP Dr Jeferson Hassan in [place]\par Smoking status: never\par social ETOH\par no drug use\par Current exercise: walking 1-2 miles 3-4x/week\par Daily water intake: 32-50 oz\par Daily caffeine intake: 2 cups coffee\par OTC medications: tylenol PRN\par Family hx: mother rheumatic fever\par Previous hospitalizations: COVID 01/2021\par

## 2023-06-01 NOTE — DISCUSSION/SUMMARY
[FreeTextEntry1] : Pt is a 72 y/o M with PMH sev MR s/p minimally invasive MV repair 04/2023, SVT\par \par sev MR:\par s/p minimally invasive MV repair 04/2023\par c/w ASA 81mg qd\par c/w metoprolol\par repeat TTE and labs\par \par SVT:\par metoprolol 50mg has helped with symptoms\par Advised to stay well hydrated, regular exercise\par \par HTN:\par c/w metoprolol\par Advised low salt diet, regular exercise, weight loss \par He will check BP at home\par \par Pt will return in 3-4 mnths or sooner as needed but I encouraged communication via phone or portal if necessary. \par The described plan was discussed with the pt.  All questions and concerns were addressed to the best of my knowledge.

## 2023-06-01 NOTE — REVIEW OF SYSTEMS
[Negative] : Cardiovascular [Dyspnea on exertion] : not dyspnea during exertion [Chest Discomfort] : no chest discomfort [Leg Claudication] : no intermittent leg claudication [Palpitations] : no palpitations [Orthopnea] : no orthopnea [Syncope] : no syncope

## 2023-06-01 NOTE — PHYSICAL EXAM
[Well Developed] : well developed [Well Nourished] : well nourished [No Acute Distress] : no acute distress [Normal Conjunctiva] : normal conjunctiva [Normal Venous Pressure] : normal venous pressure [No Carotid Bruit] : no carotid bruit [Normal S1, S2] : normal S1, S2 [No Rub] : no rub [No Gallop] : no gallop [Clear Lung Fields] : clear lung fields [Good Air Entry] : good air entry [No Respiratory Distress] : no respiratory distress  [Soft] : abdomen soft [Non Tender] : non-tender [No Masses/organomegaly] : no masses/organomegaly [Normal Bowel Sounds] : normal bowel sounds [Normal Gait] : normal gait [No Edema] : no edema [No Cyanosis] : no cyanosis [No Clubbing] : no clubbing [No Varicosities] : no varicosities [No Rash] : no rash [No Skin Lesions] : no skin lesions [Moves all extremities] : moves all extremities [No Focal Deficits] : no focal deficits [Normal Speech] : normal speech [Alert and Oriented] : alert and oriented [Normal memory] : normal memory [de-identified] : +sys murmur

## 2023-06-08 LAB
ALBUMIN SERPL ELPH-MCNC: 4.9 G/DL
ALP BLD-CCNC: 87 U/L
ALT SERPL-CCNC: 27 U/L
ANION GAP SERPL CALC-SCNC: 19 MMOL/L
AST SERPL-CCNC: 23 U/L
BILIRUB SERPL-MCNC: 0.3 MG/DL
BUN SERPL-MCNC: 20 MG/DL
CALCIUM SERPL-MCNC: 9.7 MG/DL
CHLORIDE SERPL-SCNC: 100 MMOL/L
CHOLEST SERPL-MCNC: 207 MG/DL
CO2 SERPL-SCNC: 22 MMOL/L
CREAT SERPL-MCNC: 0.89 MG/DL
EGFR: 92 ML/MIN/1.73M2
ESTIMATED AVERAGE GLUCOSE: 111 MG/DL
FOLATE SERPL-MCNC: >20 NG/ML
GLUCOSE SERPL-MCNC: 59 MG/DL
HBA1C MFR BLD HPLC: 5.5 %
HDLC SERPL-MCNC: 54 MG/DL
LDLC SERPL CALC-MCNC: 107 MG/DL
MAGNESIUM SERPL-MCNC: 2.3 MG/DL
NONHDLC SERPL-MCNC: 152 MG/DL
POTASSIUM SERPL-SCNC: 4.3 MMOL/L
PROT SERPL-MCNC: 7.3 G/DL
SODIUM SERPL-SCNC: 140 MMOL/L
TRIGL SERPL-MCNC: 226 MG/DL
TSH SERPL-ACNC: 3.15 UIU/ML
VIT B12 SERPL-MCNC: 272 PG/ML

## 2023-10-02 ENCOUNTER — APPOINTMENT (OUTPATIENT)
Dept: CARDIOLOGY | Facility: CLINIC | Age: 73
End: 2023-10-02
Payer: COMMERCIAL

## 2023-10-02 VITALS
BODY MASS INDEX: 31.02 KG/M2 | OXYGEN SATURATION: 98 % | HEART RATE: 86 BPM | SYSTOLIC BLOOD PRESSURE: 124 MMHG | DIASTOLIC BLOOD PRESSURE: 77 MMHG | WEIGHT: 193 LBS | HEIGHT: 66 IN

## 2023-10-02 PROCEDURE — 93000 ELECTROCARDIOGRAM COMPLETE: CPT

## 2023-10-02 PROCEDURE — 93306 TTE W/DOPPLER COMPLETE: CPT

## 2023-10-02 PROCEDURE — 99214 OFFICE O/P EST MOD 30 MIN: CPT | Mod: 25

## 2023-10-02 RX ORDER — METOPROLOL SUCCINATE 50 MG/1
50 TABLET, EXTENDED RELEASE ORAL
Qty: 90 | Refills: 3 | Status: ACTIVE | COMMUNITY
Start: 2022-04-01 | End: 1900-01-01

## 2023-10-02 RX ORDER — KETOCONAZOLE 20 MG/G
2 CREAM TOPICAL
Qty: 60 | Refills: 0 | Status: ACTIVE | COMMUNITY
Start: 2023-09-15

## 2023-11-02 RX ORDER — LOSARTAN POTASSIUM 50 MG/1
50 TABLET, FILM COATED ORAL DAILY
Qty: 90 | Refills: 3 | Status: ACTIVE | COMMUNITY
Start: 2022-08-22

## 2024-02-22 RX ORDER — AMOXICILLIN 500 MG/1
500 TABLET, FILM COATED ORAL
Qty: 4 | Refills: 0 | Status: ACTIVE | COMMUNITY
Start: 2023-10-04

## 2024-04-02 ENCOUNTER — APPOINTMENT (OUTPATIENT)
Dept: CARDIOLOGY | Facility: CLINIC | Age: 74
End: 2024-04-02
Payer: COMMERCIAL

## 2024-04-02 ENCOUNTER — NON-APPOINTMENT (OUTPATIENT)
Age: 74
End: 2024-04-02

## 2024-04-02 VITALS
HEART RATE: 85 BPM | DIASTOLIC BLOOD PRESSURE: 90 MMHG | BODY MASS INDEX: 31.5 KG/M2 | HEIGHT: 66 IN | SYSTOLIC BLOOD PRESSURE: 146 MMHG | OXYGEN SATURATION: 99 % | WEIGHT: 196 LBS

## 2024-04-02 DIAGNOSIS — R01.1 CARDIAC MURMUR, UNSPECIFIED: ICD-10-CM

## 2024-04-02 DIAGNOSIS — I10 ESSENTIAL (PRIMARY) HYPERTENSION: ICD-10-CM

## 2024-04-02 DIAGNOSIS — I34.0 NONRHEUMATIC MITRAL (VALVE) INSUFFICIENCY: ICD-10-CM

## 2024-04-02 PROCEDURE — 93000 ELECTROCARDIOGRAM COMPLETE: CPT

## 2024-04-02 PROCEDURE — 99214 OFFICE O/P EST MOD 30 MIN: CPT | Mod: 25

## 2024-04-02 NOTE — DISCUSSION/SUMMARY
[EKG obtained to assist in diagnosis and management of assessed problem(s)] : EKG obtained to assist in diagnosis and management of assessed problem(s) [FreeTextEntry1] : Pt is a 74 y/o M with PMH sev MR s/p minimally invasive MV repair 04/2023, SVT  sev MR: s/p minimally invasive MV repair 04/2023 TTE 10/2023 shows normal LV function, at least mod MR, BAILEY - will try to get copy of TTE done right after surgery to compare repeat TTE - if still with significant MR will refer back to CT surgery pt feeling well, staying active - walks 15,000 steps qd c/w ASA 81mg qd c/w metoprolol  SVT: metoprolol 50mg has helped with symptoms Advised to stay well hydrated, regular exercise  HTN: c/w metoprolol he stopped his losartan - advised to restart as his BP is high  Advised low salt diet, regular exercise, weight loss  He will check BP at home  HLD: Advised lifestyle modifications   Pt will return in 6 mnths or sooner as needed but I encouraged communication via phone or portal if necessary.  The described plan was discussed with the pt.  All questions and concerns were addressed to the best of my knowledge.

## 2024-04-02 NOTE — HISTORY OF PRESENT ILLNESS
[FreeTextEntry1] : Pt is a 72 y/o M PMH sev MR, EF 65%, SVT dx about 20 yrs ago, he has been taking metoprolol 25mg and losartan 50mg qd DARREN 11/2022 EF 60-65%, P2 segment with prolapse and flail with torrential MR He is s/p mitral valve repair minimally invasive surgery with Dr Reeves at [facility] 04/2023.  He was placed on amio and colchicine for 30 days post surgery   Pt reports feeling well and has no active cardiac complaints - denies CP, SOB, palpitations, dizziness, syncope, edema, orthopnea, PND, orthopnea.  No exertional symptoms. No new hospitalizations, emergency room/urgent care visits, new medical diagnoses, new medications, surgery, or cardiac testing since last OV.  Last TTE showed at least mod MR with BAILEY He stopped his losartan on his own  TTE 08/2022 EF 65%, sev MR - possible flail leaflet, sev LAE, mild to mod TR DARREN 11/2022 EF 60-65%, P2 segment with prolapse and flail with torrential MR TTE 10/2023 EF 56%, MV repair with at least mod MR, mild TR, PASP 19 quezada 02/2021 NSR brief episodes SVT nuclear stress test 04/2021 normal myocardial perfusion, EF 58% cardiac cath 11/2022 LAD and Cx mild atherosclerosis CUS 11/2022 wnl  PMH: SVT PCP Dr Jeferson Hassan in [place] Smoking status: never social ETOH no drug use Current exercise: walking 10-15,000 steps daily Daily water intake: 32-50 oz Daily caffeine intake: 2 cups coffee OTC medications: tylenol PRN Family hx: mother rheumatic fever Previous hospitalizations: COVID 01/2021

## 2024-04-02 NOTE — REVIEW OF SYSTEMS
[Negative] : Heme/Lymph [Dyspnea on exertion] : not dyspnea during exertion [Chest Discomfort] : no chest discomfort [Leg Claudication] : no intermittent leg claudication [Palpitations] : no palpitations [Syncope] : no syncope [Orthopnea] : no orthopnea

## 2024-04-02 NOTE — PHYSICAL EXAM
[Well Nourished] : well nourished [Well Developed] : well developed [Normal Conjunctiva] : normal conjunctiva [No Acute Distress] : no acute distress [Normal Venous Pressure] : normal venous pressure [No Carotid Bruit] : no carotid bruit [Normal S1, S2] : normal S1, S2 [No Rub] : no rub [No Gallop] : no gallop [Clear Lung Fields] : clear lung fields [Good Air Entry] : good air entry [No Respiratory Distress] : no respiratory distress  [Soft] : abdomen soft [No Masses/organomegaly] : no masses/organomegaly [Non Tender] : non-tender [Normal Gait] : normal gait [Normal Bowel Sounds] : normal bowel sounds [No Cyanosis] : no cyanosis [No Edema] : no edema [No Clubbing] : no clubbing [No Varicosities] : no varicosities [No Skin Lesions] : no skin lesions [No Rash] : no rash [Moves all extremities] : moves all extremities [No Focal Deficits] : no focal deficits [Normal Speech] : normal speech [Normal memory] : normal memory [Alert and Oriented] : alert and oriented [de-identified] : +sys murmur

## 2024-04-04 LAB
ALBUMIN SERPL ELPH-MCNC: 4.8 G/DL
ALP BLD-CCNC: 88 U/L
ALT SERPL-CCNC: 25 U/L
ANION GAP SERPL CALC-SCNC: 13 MMOL/L
AST SERPL-CCNC: 19 U/L
BILIRUB SERPL-MCNC: 0.6 MG/DL
BUN SERPL-MCNC: 16 MG/DL
CALCIUM SERPL-MCNC: 9.7 MG/DL
CHLORIDE SERPL-SCNC: 102 MMOL/L
CHOLEST SERPL-MCNC: 211 MG/DL
CO2 SERPL-SCNC: 24 MMOL/L
CREAT SERPL-MCNC: 0.92 MG/DL
EGFR: 89 ML/MIN/1.73M2
ESTIMATED AVERAGE GLUCOSE: 114 MG/DL
GLUCOSE SERPL-MCNC: 113 MG/DL
HBA1C MFR BLD HPLC: 5.6 %
HCT VFR BLD CALC: 45.3 %
HDLC SERPL-MCNC: 50 MG/DL
HGB BLD-MCNC: 15.3 G/DL
LDLC SERPL CALC-MCNC: 118 MG/DL
MAGNESIUM SERPL-MCNC: 2 MG/DL
MCHC RBC-ENTMCNC: 30.2 PG
MCHC RBC-ENTMCNC: 33.8 GM/DL
MCV RBC AUTO: 89.3 FL
NONHDLC SERPL-MCNC: 161 MG/DL
PLATELET # BLD AUTO: 201 K/UL
POTASSIUM SERPL-SCNC: 4.4 MMOL/L
PROT SERPL-MCNC: 7.5 G/DL
RBC # BLD: 5.07 M/UL
RBC # FLD: 13.6 %
SODIUM SERPL-SCNC: 140 MMOL/L
TRIGL SERPL-MCNC: 246 MG/DL
TSH SERPL-ACNC: 1.59 UIU/ML
WBC # FLD AUTO: 6.95 K/UL

## 2024-05-09 ENCOUNTER — APPOINTMENT (OUTPATIENT)
Dept: CARDIOLOGY | Facility: CLINIC | Age: 74
End: 2024-05-09
Payer: COMMERCIAL

## 2024-05-09 PROCEDURE — 93306 TTE W/DOPPLER COMPLETE: CPT

## 2024-05-28 RX ORDER — ASPIRIN 81 MG/1
81 TABLET, COATED ORAL
Qty: 90 | Refills: 1 | Status: ACTIVE | COMMUNITY
Start: 2023-04-09

## 2024-10-02 ENCOUNTER — APPOINTMENT (OUTPATIENT)
Dept: CARDIOLOGY | Facility: CLINIC | Age: 74
End: 2024-10-02

## 2024-10-02 ENCOUNTER — NON-APPOINTMENT (OUTPATIENT)
Age: 74
End: 2024-10-02

## 2024-10-02 VITALS
HEART RATE: 86 BPM | OXYGEN SATURATION: 97 % | WEIGHT: 195 LBS | DIASTOLIC BLOOD PRESSURE: 84 MMHG | SYSTOLIC BLOOD PRESSURE: 146 MMHG | HEIGHT: 66 IN | BODY MASS INDEX: 31.34 KG/M2

## 2024-10-02 DIAGNOSIS — I34.0 NONRHEUMATIC MITRAL (VALVE) INSUFFICIENCY: ICD-10-CM

## 2024-10-02 DIAGNOSIS — I10 ESSENTIAL (PRIMARY) HYPERTENSION: ICD-10-CM

## 2024-10-02 DIAGNOSIS — I47.10 SUPRAVENTRICULAR TACHYCARDIA, UNSPECIFIED: ICD-10-CM

## 2024-10-02 PROCEDURE — 99214 OFFICE O/P EST MOD 30 MIN: CPT | Mod: 25

## 2024-10-02 PROCEDURE — 93000 ELECTROCARDIOGRAM COMPLETE: CPT | Mod: 59

## 2024-10-09 NOTE — PHYSICAL EXAM
[Well Developed] : well developed [Well Nourished] : well nourished [No Acute Distress] : no acute distress [Normal Conjunctiva] : normal conjunctiva [Normal Venous Pressure] : normal venous pressure [No Carotid Bruit] : no carotid bruit [Normal S1, S2] : normal S1, S2 [No Rub] : no rub [No Gallop] : no gallop [Clear Lung Fields] : clear lung fields [Good Air Entry] : good air entry [No Respiratory Distress] : no respiratory distress  [Soft] : abdomen soft [Non Tender] : non-tender [No Masses/organomegaly] : no masses/organomegaly [Normal Bowel Sounds] : normal bowel sounds [Normal Gait] : normal gait [No Edema] : no edema [No Cyanosis] : no cyanosis [No Clubbing] : no clubbing [No Varicosities] : no varicosities [No Rash] : no rash [No Skin Lesions] : no skin lesions [Moves all extremities] : moves all extremities [No Focal Deficits] : no focal deficits [Normal Speech] : normal speech [Alert and Oriented] : alert and oriented [Normal memory] : normal memory [de-identified] : +sys murmur

## 2024-10-09 NOTE — HISTORY OF PRESENT ILLNESS
[FreeTextEntry1] : Pt is a 74 y/o M PMH sev MR, EF 65%, SVT dx about 20 yrs ago, he has been taking metoprolol 25mg and losartan 50mg qd DARREN 11/2022 EF 60-65%, P2 segment with prolapse and flail with torrential MR He is s/p mitral valve repair minimally invasive surgery with Dr Reeves at [facility] 04/2023.  He was placed on amio and colchicine for 30 days post surgery   Pt reports feeling well and has no active cardiac complaints - denies CP, SOB, palpitations, dizziness, syncope, edema, orthopnea, PND, orthopnea.  No exertional symptoms. No new hospitalizations, emergency room/urgent care visits, new medical diagnoses, new medications, surgery, or cardiac testing since last OV.  Last TTE showed at least mod MR with BAILEY He stopped his losartan on his own He has also stopped ASA   TTE 08/2022 EF 65%, sev MR - possible flail leaflet, sev LAE, mild to mod TR DARREN 11/2022 EF 60-65%, P2 segment with prolapse and flail with torrential MR DARREN 04/2023 post op EF 55% no MR/MS.  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

## 2024-10-09 NOTE — DISCUSSION/SUMMARY
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 in the presence of BAILEY gradient = 34mmHg, mod MR, mild TR pt feeling well, staying active - walks 15,000 steps qd restart ASA 81mg qd increase metoprolol succ to 100mg qd to help decrease HR  SVT: metoprolol 100mg has helped with symptoms Advised to stay well hydrated, regular exercise  HTN: increase metoprolol succ to 100mg qd he stopped his losartan - he has never restarted this after last OV Advised low salt diet, regular exercise, weight loss  He will check BP at home  HLD: Advised lifestyle modifications   Pt will return in 6 mnths or sooner as needed but I encouraged communication via phone or portal if necessary.  The described plan was discussed with the pt.  All questions and concerns were addressed to the best of my knowledge.

## 2024-10-09 NOTE — HISTORY OF PRESENT ILLNESS
[FreeTextEntry1] : Pt is a 74 y/o M PMH sev MR, EF 65%, SVT dx about 20 yrs ago, he has been taking metoprolol 25mg and losartan 50mg qd DARREN 11/2022 EF 60-65%, P2 segment with prolapse and flail with torrential MR He is s/p mitral valve repair minimally invasive surgery with Dr Reeves at [facility] 04/2023.  He was placed on amio and colchicine for 30 days post surgery   Pt reports feeling well and has no active cardiac complaints - denies CP, SOB, palpitations, dizziness, syncope, edema, orthopnea, PND, orthopnea.  No exertional symptoms. No new hospitalizations, emergency room/urgent care visits, new medical diagnoses, new medications, surgery, or cardiac testing since last OV.  Last TTE showed at least mod MR with BAILEY He stopped his losartan on his own He has also stopped ASA   TTE 08/2022 EF 65%, sev MR - possible flail leaflet, sev LAE, mild to mod TR DARREN 11/2022 EF 60-65%, P2 segment with prolapse and flail with torrential MR DARREN 04/2023 post op EF 55% no MR/MS.  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

## 2024-10-09 NOTE — DISCUSSION/SUMMARY
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 in the presence of BAILEY gradient = 34mmHg, mod MR, mild TR pt feeling well, staying active - walks 15,000 steps qd restart ASA 81mg qd increase metoprolol succ to 100mg qd to help decrease HR  SVT: metoprolol 100mg has helped with symptoms Advised to stay well hydrated, regular exercise  HTN: increase metoprolol succ to 100mg qd he stopped his losartan - he has never restarted this after last OV Advised low salt diet, regular exercise, weight loss  He will check BP at home  HLD: Advised lifestyle modifications   Pt will return in 6 mnths or sooner as needed but I encouraged communication via phone or portal if necessary.  The described plan was discussed with the pt.  All questions and concerns were addressed to the best of my knowledge.

## 2024-10-09 NOTE — PHYSICAL EXAM
[Well Developed] : well developed [Well Nourished] : well nourished [No Acute Distress] : no acute distress [Normal Conjunctiva] : normal conjunctiva [Normal Venous Pressure] : normal venous pressure [No Carotid Bruit] : no carotid bruit [Normal S1, S2] : normal S1, S2 [No Rub] : no rub [No Gallop] : no gallop [Clear Lung Fields] : clear lung fields [Good Air Entry] : good air entry [No Respiratory Distress] : no respiratory distress  [Soft] : abdomen soft [Non Tender] : non-tender [No Masses/organomegaly] : no masses/organomegaly [Normal Bowel Sounds] : normal bowel sounds [Normal Gait] : normal gait [No Edema] : no edema [No Cyanosis] : no cyanosis [No Clubbing] : no clubbing [No Varicosities] : no varicosities [No Rash] : no rash [No Skin Lesions] : no skin lesions [Moves all extremities] : moves all extremities [No Focal Deficits] : no focal deficits [Normal Speech] : normal speech [Alert and Oriented] : alert and oriented [Normal memory] : normal memory [de-identified] : +sys murmur

## 2024-10-09 NOTE — REVIEW OF SYSTEMS
[Negative] : Heme/Lymph [Dyspnea on exertion] : not dyspnea during exertion [Chest Discomfort] : no chest discomfort [Leg Claudication] : no intermittent leg claudication [Palpitations] : no palpitations [Orthopnea] : no orthopnea [Syncope] : no syncope

## 2024-11-07 DIAGNOSIS — E87.5 HYPERKALEMIA: ICD-10-CM

## 2024-11-11 RX ORDER — AMOXICILLIN 500 MG/1
500 CAPSULE ORAL
Qty: 4 | Refills: 0 | Status: ACTIVE | COMMUNITY
Start: 2024-11-11

## 2025-01-15 DIAGNOSIS — E78.5 HYPERLIPIDEMIA, UNSPECIFIED: ICD-10-CM

## 2025-01-17 LAB
CHOLEST SERPL-MCNC: 201 MG/DL
HDLC SERPL-MCNC: 52 MG/DL
LDLC SERPL CALC-MCNC: 115 MG/DL
NONHDLC SERPL-MCNC: 149 MG/DL
TRIGL SERPL-MCNC: 195 MG/DL

## 2025-02-17 ENCOUNTER — NON-APPOINTMENT (OUTPATIENT)
Age: 75
End: 2025-02-17

## 2025-02-17 ENCOUNTER — APPOINTMENT (OUTPATIENT)
Dept: CARDIOLOGY | Facility: CLINIC | Age: 75
End: 2025-02-17
Payer: COMMERCIAL

## 2025-02-17 VITALS
SYSTOLIC BLOOD PRESSURE: 140 MMHG | DIASTOLIC BLOOD PRESSURE: 88 MMHG | HEIGHT: 66 IN | WEIGHT: 195 LBS | BODY MASS INDEX: 31.34 KG/M2 | HEART RATE: 89 BPM | OXYGEN SATURATION: 98 %

## 2025-02-17 DIAGNOSIS — I38 ENDOCARDITIS, VALVE UNSPECIFIED: ICD-10-CM

## 2025-02-17 DIAGNOSIS — E78.5 HYPERLIPIDEMIA, UNSPECIFIED: ICD-10-CM

## 2025-02-17 DIAGNOSIS — I10 ESSENTIAL (PRIMARY) HYPERTENSION: ICD-10-CM

## 2025-02-17 PROCEDURE — 93000 ELECTROCARDIOGRAM COMPLETE: CPT

## 2025-02-17 PROCEDURE — 99214 OFFICE O/P EST MOD 30 MIN: CPT | Mod: 25

## 2025-05-13 ENCOUNTER — APPOINTMENT (OUTPATIENT)
Dept: CARDIOLOGY | Facility: CLINIC | Age: 75
End: 2025-05-13
Payer: COMMERCIAL

## 2025-05-13 PROCEDURE — 93306 TTE W/DOPPLER COMPLETE: CPT

## 2025-08-26 ENCOUNTER — APPOINTMENT (OUTPATIENT)
Dept: CARDIOLOGY | Facility: CLINIC | Age: 75
End: 2025-08-26
Payer: COMMERCIAL

## 2025-08-26 VITALS
HEIGHT: 66 IN | BODY MASS INDEX: 31.02 KG/M2 | SYSTOLIC BLOOD PRESSURE: 138 MMHG | DIASTOLIC BLOOD PRESSURE: 82 MMHG | WEIGHT: 193 LBS | OXYGEN SATURATION: 98 % | HEART RATE: 89 BPM

## 2025-08-26 DIAGNOSIS — E78.5 HYPERLIPIDEMIA, UNSPECIFIED: ICD-10-CM

## 2025-08-26 DIAGNOSIS — I38 ENDOCARDITIS, VALVE UNSPECIFIED: ICD-10-CM

## 2025-08-26 DIAGNOSIS — I10 ESSENTIAL (PRIMARY) HYPERTENSION: ICD-10-CM

## 2025-08-26 PROCEDURE — 93000 ELECTROCARDIOGRAM COMPLETE: CPT

## 2025-08-26 PROCEDURE — 99214 OFFICE O/P EST MOD 30 MIN: CPT | Mod: 25
